# Patient Record
Sex: FEMALE | Race: WHITE | NOT HISPANIC OR LATINO | Employment: OTHER | URBAN - METROPOLITAN AREA
[De-identification: names, ages, dates, MRNs, and addresses within clinical notes are randomized per-mention and may not be internally consistent; named-entity substitution may affect disease eponyms.]

---

## 2018-10-04 NOTE — PATIENT DISCUSSION
"""S/P IOL OD: Tecnis ZCB00 21.5 (Target: [place]) +Omidria.  Continue post operative instructions ""

## 2018-10-09 NOTE — PATIENT DISCUSSION
"""S/P IOL OD: Tecnis ZCB00 21.5 (Target: [place]) +Omidria. Continue post operative instructions and drops per schedule.  """

## 2018-10-09 NOTE — PATIENT DISCUSSION
"""[place] with IOL OS: 10/18/2018 Tecmichael ZCB00 21.5 Target: [facility]

## 2018-10-18 NOTE — PATIENT DISCUSSION
"""S/P IOL OS: Tecnis ZCB00 21.5 (Target: [place]) +TM/Omidria. Continue post operative instructions and drops per schedule.  """

## 2018-10-23 NOTE — PATIENT DISCUSSION
"""S/P IOL OS: Tecnis ZCB00 21.5 (Target: [place]) +TM/Omidria. Continue post operative instructions and drops per schedule.  """

## 2018-11-13 NOTE — PATIENT DISCUSSION
"""S/P IOL OU: OD: Tecnis ZCB00 21.5 (Target: [place])Omidria.  OS: Tecnis ZCB00 21.5 (Target: ""

## 2021-01-27 ENCOUNTER — NEW PATIENT COMPREHENSIVE (OUTPATIENT)
Dept: URBAN - METROPOLITAN AREA CLINIC 38 | Facility: CLINIC | Age: 56
End: 2021-01-27

## 2021-01-27 DIAGNOSIS — H52.03: ICD-10-CM

## 2021-01-27 DIAGNOSIS — H52.4: ICD-10-CM

## 2021-01-27 DIAGNOSIS — H52.203: ICD-10-CM

## 2021-01-27 PROCEDURE — 92004 COMPRE OPH EXAM NEW PT 1/>: CPT

## 2021-01-27 PROCEDURE — 92015 DETERMINE REFRACTIVE STATE: CPT

## 2021-01-27 ASSESSMENT — VISUAL ACUITY
OS_PH: 20/20-1
OD_SC: J12
OS_SC: 20/40
OS_SC: J12
OD_SC: 20/25

## 2021-01-27 ASSESSMENT — TONOMETRY
OD_IOP_MMHG: 14
OS_IOP_MMHG: 15

## 2022-04-26 ENCOUNTER — COMPREHENSIVE EXAM (OUTPATIENT)
Dept: URBAN - METROPOLITAN AREA CLINIC 38 | Facility: CLINIC | Age: 57
End: 2022-04-26

## 2022-04-26 DIAGNOSIS — H25.13: ICD-10-CM

## 2022-04-26 DIAGNOSIS — H01.111: ICD-10-CM

## 2022-04-26 DIAGNOSIS — H52.4: ICD-10-CM

## 2022-04-26 DIAGNOSIS — H52.03: ICD-10-CM

## 2022-04-26 DIAGNOSIS — H01.02B: ICD-10-CM

## 2022-04-26 DIAGNOSIS — H01.02A: ICD-10-CM

## 2022-04-26 DIAGNOSIS — H01.114: ICD-10-CM

## 2022-04-26 DIAGNOSIS — H52.203: ICD-10-CM

## 2022-04-26 DIAGNOSIS — H04.123: ICD-10-CM

## 2022-04-26 PROCEDURE — 92015 DETERMINE REFRACTIVE STATE: CPT

## 2022-04-26 PROCEDURE — 92014 COMPRE OPH EXAM EST PT 1/>: CPT

## 2022-04-26 ASSESSMENT — VISUAL ACUITY
OD_SC: 20/25+1
OS_PH: 20/20-2
OD_SC: J12
OS_SC: J12>
OS_SC: 20/40

## 2022-04-26 ASSESSMENT — TONOMETRY
OS_IOP_MMHG: 18
OD_IOP_MMHG: 18

## 2023-04-27 ENCOUNTER — COMPREHENSIVE EXAM (OUTPATIENT)
Dept: URBAN - METROPOLITAN AREA CLINIC 38 | Facility: CLINIC | Age: 58
End: 2023-04-27

## 2023-04-27 DIAGNOSIS — H52.4: ICD-10-CM

## 2023-04-27 DIAGNOSIS — H04.123: ICD-10-CM

## 2023-04-27 DIAGNOSIS — H52.203: ICD-10-CM

## 2023-04-27 DIAGNOSIS — H01.111: ICD-10-CM

## 2023-04-27 DIAGNOSIS — H25.13: ICD-10-CM

## 2023-04-27 DIAGNOSIS — H01.02B: ICD-10-CM

## 2023-04-27 DIAGNOSIS — H52.03: ICD-10-CM

## 2023-04-27 DIAGNOSIS — H01.02A: ICD-10-CM

## 2023-04-27 DIAGNOSIS — H01.114: ICD-10-CM

## 2023-04-27 PROCEDURE — 92014 COMPRE OPH EXAM EST PT 1/>: CPT

## 2023-04-27 PROCEDURE — 92015 DETERMINE REFRACTIVE STATE: CPT

## 2023-04-27 ASSESSMENT — VISUAL ACUITY
OD_SC: 20/40
OD_PH: 20/25
OS_SC: 20/40
OS_PH: 20/20
OS_SC: J12
OD_SC: J12

## 2023-04-27 ASSESSMENT — TONOMETRY
OD_IOP_MMHG: 16
OS_IOP_MMHG: 16

## 2024-04-29 ENCOUNTER — COMPREHENSIVE EXAM (OUTPATIENT)
Dept: URBAN - METROPOLITAN AREA CLINIC 38 | Facility: CLINIC | Age: 59
End: 2024-04-29

## 2024-04-29 DIAGNOSIS — H52.03: ICD-10-CM

## 2024-04-29 DIAGNOSIS — H04.123: ICD-10-CM

## 2024-04-29 DIAGNOSIS — H52.4: ICD-10-CM

## 2024-04-29 DIAGNOSIS — H52.203: ICD-10-CM

## 2024-04-29 DIAGNOSIS — H25.13: ICD-10-CM

## 2024-04-29 PROCEDURE — 92014 COMPRE OPH EXAM EST PT 1/>: CPT

## 2024-04-29 PROCEDURE — 92015 DETERMINE REFRACTIVE STATE: CPT

## 2024-04-29 ASSESSMENT — VISUAL ACUITY
OS_PH: 20/25-1
OU_SC: J8
OD_PH: 20/25
OU_SC: 20/30-1
OD_SC: 20/40-
OD_SC: J12
OS_SC: 20/50-2
OS_SC: J12

## 2024-04-29 ASSESSMENT — TONOMETRY
OS_IOP_MMHG: 16
OD_IOP_MMHG: 16

## 2025-04-30 ENCOUNTER — COMPREHENSIVE EXAM (OUTPATIENT)
Age: 60
End: 2025-04-30

## 2025-04-30 DIAGNOSIS — H52.03: ICD-10-CM

## 2025-04-30 DIAGNOSIS — H52.4: ICD-10-CM

## 2025-04-30 DIAGNOSIS — H52.203: ICD-10-CM

## 2025-04-30 PROCEDURE — 92015 DETERMINE REFRACTIVE STATE: CPT

## 2025-04-30 PROCEDURE — 92014 COMPRE OPH EXAM EST PT 1/>: CPT
